# Patient Record
Sex: FEMALE | Race: WHITE | NOT HISPANIC OR LATINO | Employment: FULL TIME | URBAN - METROPOLITAN AREA
[De-identification: names, ages, dates, MRNs, and addresses within clinical notes are randomized per-mention and may not be internally consistent; named-entity substitution may affect disease eponyms.]

---

## 2020-01-16 ENCOUNTER — HISTORICAL (OUTPATIENT)
Dept: ADMINISTRATIVE | Facility: HOSPITAL | Age: 25
End: 2020-01-16

## 2020-02-27 ENCOUNTER — HISTORICAL (OUTPATIENT)
Dept: ADMINISTRATIVE | Facility: HOSPITAL | Age: 25
End: 2020-02-27

## 2022-05-03 NOTE — HISTORICAL [facility]
This is a historical note converted from [employer]. Formatting and pictures may have been removed.  Please reference [employer] for original formatting and attached multimedia. Chief Complaint  3.5 WEEK F/U IMN R FEMUR, AMBULATING WITH CRUTCHES, SOME PAIN,  History of Present Illness  Patient is 3.5 weeks s/p IMN right femur. Here today for wound check and x-rays. She is WBAT to RLE with crutches. Denies pain, doing well overall.  Review of Systems  Constitutional: negative except as stated in HPI  Eye: negative except as stated in HPI  ENMT: negative except as stated in HPI  Respiratory: negative except as stated in HPI  Cardiovascular: negative except as stated in HPI  Gastrointestinal: negative except as stated in HPI  Genitourinary: negative except as stated in HPI  Hema/Lymph: negative except as stated in HPI  Endocrine: negative except as stated in HPI  Immunologic: negative except as stated in HPI  Musculoskeletal: negative except as stated in HPI  Integumentary: negative except as stated in HPI  Neurologic: negative except as stated in HPI  ?   All Other ROS_ ?negative except as stated in HPI  Physical Exam  Vitals & Measurements  T:?37? ?C (Oral)? HR:?72(Peripheral)? RR:?20? BP:?116/68?  HT:?163?cm? WT:?48?kg? BMI:?18.07? LMP:?01/04/2020 00:00 CST?  General-Alert, oriented, no fever, chills  Anlvfkegvienxmz-XAL-gyzinsgo?c/d/i. Wound edges well approximated. No erythema, no active drainage or necrosis. Staples removed. Thigh soft and compressible. No prominent or painful hardware. She has near full extension and flexion of knee. NVID.  ?Neurologic-Alert and oriented x4, cooperative  ?Dermatologic-Skin warm, pink, dry.  Assessment/Plan  1.?Closed traumatic displaced fracture of shaft of right femur?S72.574D  ?  Patient doing well. She can continue to WBAT to RLE, ok to wean from crutches once she feels stable. Updated PT orders provided. Ok to discontinue Aspirin. Continue to work on ROM, strengthening exercises.  Patient will RTC in 6 weeks for x-rays and evaluation. Patient agrees with treatment plan and all questions and concerns were addressed.  Referrals  Clinic Follow up, *Est. 02/27/20 3:00:00 CST, Order for future visit, Closed traumatic displaced fracture of shaft of right femur, [facility]  PT/OT External Referral, 01/16/20 8:55:00 CST, Closed traumatic displaced fracture of shaft of right femur, Evaluate and Treat, 3 X Week, WBAT, NO RESTRICTIONS   Problem List/Past Medical History  Ongoing  Closed traumatic displaced fracture of shaft of right femur  Tobacco user  Historical  No qualifying data  Procedure/Surgical History  Intramedullary Joe Insertion Femur (Right) (12/22/2019)  Reposition Right Femoral Shaft with Intramedullary Internal Fixation Device, Open Approach (12/22/2019)  Resection of Spleen, Open Approach (12/21/2019)  Splenectomy (.) (12/21/2019)  Trauma Exploratory Laparotomy (.) (12/21/2019)  None   Medications  acetaminophen-oxycodone 325 mg-5 mg oral tablet, 1 tab(s), Oral, QID  albuterol 90 mcg/inh inhalation powder, 2 puff(s), INH, q4hr, PRN  busPIRone 5 mg oral tablet, 5 mg= 1 tab(s), Oral, BID  fluconazole 150 mg oral tablet, 150 mg= 1 tab(s), Oral, Once  ibuprofen 800 mg oral tablet, 800 mg= 1 tab(s), Oral, TID  oxycodone 5 mg oral tablet, 5 mg= 1 tab(s), Oral, q6hr  phenazopyridine 200 mg oral tablet, 200 mg= 1 tab(s), Oral, BID  Allergies  No Known Allergies  No Known Medication Allergies  Social History  Abuse/Neglect  No, 01/16/2020  Alcohol  Never, 12/21/2019  Home/Environment  Lives with Significant other. Living situation: Home/Independent., 12/21/2019  Substance Use  Current, opiate pills and subaxone abuse, 12/21/2019  Tobacco  4 or less cigarettes(less than 1/4 pack)/day in last 30 days, No, 01/16/2020  Family History  Hypertension: Father.  Immunizations  Vaccine Date Status Comments   meningococcal conjugate vaccine 12/25/2019 Given    pneumococcal 23-polyvalent vaccine  12/25/2019 Given    haemophilus b conjugate (PRP-T) vaccine 12/25/2019 Given    influenza virus vaccine, inactivated 12/24/2019 Given Early/Late Reason:  Med Not Available   Health Maintenance  Health Maintenance  ???Pending?(in the next year)  ??? ??Due?  ??? ? ? ?Alcohol Misuse Screening due??01/01/20??and every 1??year(s)  ??? ? ? ?Depression Screening due??01/16/20??and every?  ??? ? ? ?Tetanus Vaccine due??01/16/20??and every 10??year(s)  ??? ??Due In Future?  ??? ? ? ?ADL Screening not due until??12/23/20??and every 1??year(s)  ??? ? ? ?Obesity Screening not due until??01/01/21??and every 1??year(s)  ??? ? ? ?Smoking Cessation not due until??01/01/21??and every 1??year(s)  ??? ? ? ?Blood Pressure Screening not due until??01/15/21??and every 1??year(s)  ??? ? ? ?Body Mass Index Check not due until??01/15/21??and every 1??year(s)  ???Satisfied?(in the past 1 year)  ??? ??Satisfied?  ??? ? ? ?ADL Screening on??12/23/19.??Satisfied by Luc Smith RN  ??? ? ? ?Blood Pressure Screening on??01/16/20.??Satisfied by Tiffany Whitlock  ??? ? ? ?Body Mass Index Check on??01/16/20.??Satisfied by Tiffany Whitlock  ??? ? ? ?Influenza Vaccine on??12/24/19.??Satisfied by Julia Abreu RN  ??? ? ? ?Obesity Screening on??01/16/20.??Satisfied by Tiffany Whitlock  ??? ? ? ?Smoking Cessation on??01/16/20.??Satisfied by Tiffany Whitlock  ?  Diagnostic Results  Right femur: X-ray shows acceptable alignment, intact hardware, evidence of interval consolidation noted

## 2022-05-03 NOTE — HISTORICAL [facility]
This is a historical note converted from [employer]. Formatting and pictures may have been removed.  Please reference [employer] for original formatting and attached multimedia. Chief Complaint  9.5 WEEK F/U IMN R FEMUR, SOME SORENESS DUE TO WEATHER  History of Present Illness  Ms. Edwards is here today for follow up evaluation 9.5 weeks status post intramedullary nailing or right femoral shaft fracture. She states she is doing very well. She is ambulatory with no assistive device. She states she does have some thigh soreness with weather changes. She denies any fever or wound issues. She has not started physical therapy secondary to transportation issues and having a new born baby. She is eager to return to work as a . She does not have any other complaints today.  Review of Systems  otherwise negative  Physical Exam  General: Awake, alert, oriented. Patient in no acute distress. Well nourished and well perfused.  Musculoskeletal:?  R LE  incisions well healed with no signs of infection  no apparent swelling  good ROM at the hip and knee without pain  calf supple and non tender  dorsi/plantar flexes foot  SLT intact distally  BCR distally  Assessment/Plan  1.?Closed traumatic displaced fracture of shaft of right femur?S72.351D  Ordered:  Clinic Follow up, *Est. 04/09/20 3:00:00 CDT, Order for future visit, Closed traumatic displaced fracture of shaft of right femur, [facility]  Post-Op follow-up visit [id number] PC, Closed traumatic displaced fracture of shaft of right femur, [facility], 02/27/20 9:30:00 CST  XR Femur Right 2 Views, Routine, *Est. 04/09/20 3:00:00 CDT, Follow Up Trauma, None, Ambulatory, Rad Type, Order for future visit, Closed traumatic displaced fracture of shaft of right femur, Not Scheduled, *Est. 04/09/20 3:00:00 CDT  ?  She is doing excellently. X-rays demonstrate good progression of bone healing. She can continue activity as tolerated. I have encouraged her to begin therapy, and  she already has orders. I have provided a release to work. She will return in 6 weeks for repeat x-rays and evaluation. All questions and concerns were addressed today. She is very happy with her progress and in agreement with the treatment plan.  ?  The above findings, diagnostics, and treatment plan were discussed with  who is in agreement with the plan of care.?  Referrals  Clinic Follow up, *Est. 04/09/20 3:00:00 CDT, Order for future visit, Closed traumatic displaced fracture of shaft of right femur, [facility]   Problem List/Past Medical History  Ongoing  Closed traumatic displaced fracture of shaft of right femur  Tobacco user  Historical  No qualifying data  Procedure/Surgical History  Intramedullary Joe Insertion Femur (Right) (12/22/2019)  Reposition Right Femoral Shaft with Intramedullary Internal Fixation Device, Open Approach (12/22/2019)  Resection of Spleen, Open Approach (12/21/2019)  Splenectomy (.) (12/21/2019)  Trauma Exploratory Laparotomy (.) (12/21/2019)  None   Medications  albuterol 90 mcg/inh inhalation powder, 2 puff(s), INH, q4hr, PRN  busPIRone 5 mg oral tablet, 5 mg= 1 tab(s), Oral, BID  ethinyl estradiol-norethindrone with iron 20 mcg-1 mg oral tablet, chewable, 1 tab(s), Oral, Daily  fluconazole 150 mg oral tablet, 150 mg= 1 tab(s), Oral, Once  ibuprofen 800 mg oral tablet, 800 mg= 1 tab(s), Oral, TID  metroNIDAZOLE 0.75% vaginal gel with applicator, 1 yoon, VAG, BID  phenazopyridine 200 mg oral tablet, 200 mg= 1 tab(s), Oral, BID  Allergies  No Known Allergies  No Known Medication Allergies  Social History  Abuse/Neglect  No, 01/16/2020  Alcohol  Never, 12/21/2019  Home/Environment  Lives with Significant other. Living situation: Home/Independent., 12/21/2019  Substance Use  Current, opiate pills and subaxone abuse, 12/21/2019  Tobacco  4 or less cigarettes(less than 1/4 pack)/day in last 30 days, No, 01/16/2020  Family History  Hypertension:  Father.  Immunizations  Vaccine Date Status Comments   meningococcal conjugate vaccine 12/25/2019 Given    pneumococcal 23-polyvalent vaccine 12/25/2019 Given    haemophilus b conjugate (PRP-T) vaccine 12/25/2019 Given    influenza virus vaccine, inactivated 12/24/2019 Given Early/Late Reason:  Med Not Available   Health Maintenance  Health Maintenance  ???Pending?(in the next year)  ??? ??Due?  ??? ? ? ?Alcohol Misuse Screening due??01/01/20??and every 1??year(s)  ??? ? ? ?Depression Screening due??02/27/20??and every?  ??? ? ? ?Tetanus Vaccine due??02/27/20??and every 10??year(s)  ??? ??Due In Future?  ??? ? ? ?ADL Screening not due until??12/23/20??and every 1??year(s)  ??? ? ? ?Obesity Screening not due until??01/01/21??and every 1??year(s)  ??? ? ? ?Smoking Cessation not due until??01/01/21??and every 1??year(s)  ??? ? ? ?Blood Pressure Screening not due until??01/15/21??and every 1??year(s)  ??? ? ? ?Body Mass Index Check not due until??01/15/21??and every 1??year(s)  ???Satisfied?(in the past 1 year)  ??? ??Satisfied?  ??? ? ? ?ADL Screening on??12/23/19.??Satisfied by Luc Smith RN  ??? ? ? ?Blood Pressure Screening on??01/16/20.??Satisfied by Tiffany Whitlock  ??? ? ? ?Body Mass Index Check on??01/16/20.??Satisfied by Tiffany Whitlock  ??? ? ? ?Cervical Cancer Screening on??02/04/20.??Satisfied by Veda De La Garza  ??? ? ? ?Influenza Vaccine on??12/24/19.??Satisfied by Julia Abreu RN  ??? ? ? ?Obesity Screening on??01/16/20.??Satisfied by Tiffany Whitlock  ??? ? ? ?Smoking Cessation on??01/16/20.??Satisfied by Tiffany Whitlock  ?  Diagnostic Results  x-rays of the right?femur?demonstrate appropriate alignment with no displacement compared to previous films; hardware intact with no signs of hardware failure; interval callous noted

## 2023-12-09 ENCOUNTER — HOSPITAL ENCOUNTER (EMERGENCY)
Facility: HOSPITAL | Age: 28
Discharge: HOME | End: 2023-12-09
Attending: STUDENT IN AN ORGANIZED HEALTH CARE EDUCATION/TRAINING PROGRAM
Payer: MEDICAID

## 2023-12-09 VITALS
HEART RATE: 78 BPM | WEIGHT: 115 LBS | SYSTOLIC BLOOD PRESSURE: 109 MMHG | RESPIRATION RATE: 18 BRPM | DIASTOLIC BLOOD PRESSURE: 73 MMHG | TEMPERATURE: 98 F | HEIGHT: 64 IN | BODY MASS INDEX: 19.63 KG/M2 | OXYGEN SATURATION: 100 %

## 2023-12-09 DIAGNOSIS — T40.1X1A HEROIN OVERDOSE, ACCIDENTAL OR UNINTENTIONAL, INITIAL ENCOUNTER: Primary | ICD-10-CM

## 2023-12-09 LAB
ALBUMIN SERPL-MCNC: 4.5 G/DL (ref 3.5–5)
ALBUMIN/GLOB SERPL: 1.3 RATIO (ref 1.1–2)
ALP SERPL-CCNC: 61 UNIT/L (ref 40–150)
ALT SERPL-CCNC: 16 UNIT/L (ref 0–55)
APPEARANCE UR: ABNORMAL
AST SERPL-CCNC: 23 UNIT/L (ref 5–34)
B-HCG SERPL QL: NEGATIVE
BACTERIA #/AREA URNS AUTO: ABNORMAL /HPF
BASOPHILS # BLD AUTO: 0.05 X10(3)/MCL
BASOPHILS NFR BLD AUTO: 0.4 %
BILIRUB SERPL-MCNC: 0.6 MG/DL
BILIRUB UR QL STRIP.AUTO: NEGATIVE
BUN SERPL-MCNC: 11 MG/DL (ref 7–18.7)
CALCIUM SERPL-MCNC: 9.1 MG/DL (ref 8.4–10.2)
CHLORIDE SERPL-SCNC: 105 MMOL/L (ref 98–107)
CO2 SERPL-SCNC: 21 MMOL/L (ref 22–29)
COLOR UR AUTO: YELLOW
CREAT SERPL-MCNC: 0.79 MG/DL (ref 0.55–1.02)
EOSINOPHIL # BLD AUTO: 0.04 X10(3)/MCL (ref 0–0.9)
EOSINOPHIL NFR BLD AUTO: 0.3 %
ERYTHROCYTE [DISTWIDTH] IN BLOOD BY AUTOMATED COUNT: 14.6 % (ref 11.5–17)
GFR SERPLBLD CREATININE-BSD FMLA CKD-EPI: >60 MLS/MIN/1.73/M2
GLOBULIN SER-MCNC: 3.4 GM/DL (ref 2.4–3.5)
GLUCOSE SERPL-MCNC: 197 MG/DL (ref 74–100)
GLUCOSE UR QL STRIP.AUTO: ABNORMAL
HCT VFR BLD AUTO: 34.1 % (ref 37–47)
HGB BLD-MCNC: 11.2 G/DL (ref 12–16)
IMM GRANULOCYTES # BLD AUTO: 0.08 X10(3)/MCL (ref 0–0.04)
IMM GRANULOCYTES NFR BLD AUTO: 0.6 %
KETONES UR QL STRIP.AUTO: ABNORMAL
LEUKOCYTE ESTERASE UR QL STRIP.AUTO: 25
LYMPHOCYTES # BLD AUTO: 2.91 X10(3)/MCL (ref 0.6–4.6)
LYMPHOCYTES NFR BLD AUTO: 20.7 %
MCH RBC QN AUTO: 30.7 PG (ref 27–31)
MCHC RBC AUTO-ENTMCNC: 32.8 G/DL (ref 33–36)
MCV RBC AUTO: 93.4 FL (ref 80–94)
MONOCYTES # BLD AUTO: 0.65 X10(3)/MCL (ref 0.1–1.3)
MONOCYTES NFR BLD AUTO: 4.6 %
MUCOUS THREADS URNS QL MICRO: ABNORMAL /LPF
NEUTROPHILS # BLD AUTO: 10.34 X10(3)/MCL (ref 2.1–9.2)
NEUTROPHILS NFR BLD AUTO: 73.4 %
NITRITE UR QL STRIP.AUTO: NEGATIVE
NRBC BLD AUTO-RTO: 0 %
PH UR STRIP.AUTO: 5.5 [PH]
PLATELET # BLD AUTO: 250 X10(3)/MCL (ref 130–400)
PMV BLD AUTO: 12 FL (ref 7.4–10.4)
POTASSIUM SERPL-SCNC: 3.5 MMOL/L (ref 3.5–5.1)
PROT SERPL-MCNC: 7.9 GM/DL (ref 6.4–8.3)
PROT UR QL STRIP.AUTO: ABNORMAL
RBC # BLD AUTO: 3.65 X10(6)/MCL (ref 4.2–5.4)
RBC #/AREA URNS AUTO: ABNORMAL /HPF
RBC UR QL AUTO: NEGATIVE
SODIUM SERPL-SCNC: 137 MMOL/L (ref 136–145)
SP GR UR STRIP.AUTO: 1.03 (ref 1–1.03)
SQUAMOUS #/AREA URNS LPF: ABNORMAL /HPF
UROBILINOGEN UR STRIP-ACNC: NORMAL
WBC # SPEC AUTO: 14.07 X10(3)/MCL (ref 4.5–11.5)
WBC #/AREA URNS AUTO: ABNORMAL /HPF

## 2023-12-09 PROCEDURE — 87186 SC STD MICRODIL/AGAR DIL: CPT | Performed by: STUDENT IN AN ORGANIZED HEALTH CARE EDUCATION/TRAINING PROGRAM

## 2023-12-09 PROCEDURE — 80053 COMPREHEN METABOLIC PANEL: CPT | Performed by: STUDENT IN AN ORGANIZED HEALTH CARE EDUCATION/TRAINING PROGRAM

## 2023-12-09 PROCEDURE — 81025 URINE PREGNANCY TEST: CPT | Performed by: STUDENT IN AN ORGANIZED HEALTH CARE EDUCATION/TRAINING PROGRAM

## 2023-12-09 PROCEDURE — 87086 URINE CULTURE/COLONY COUNT: CPT | Performed by: STUDENT IN AN ORGANIZED HEALTH CARE EDUCATION/TRAINING PROGRAM

## 2023-12-09 PROCEDURE — 85025 COMPLETE CBC W/AUTO DIFF WBC: CPT | Performed by: STUDENT IN AN ORGANIZED HEALTH CARE EDUCATION/TRAINING PROGRAM

## 2023-12-09 PROCEDURE — 96361 HYDRATE IV INFUSION ADD-ON: CPT

## 2023-12-09 PROCEDURE — 96374 THER/PROPH/DIAG INJ IV PUSH: CPT

## 2023-12-09 PROCEDURE — 63600175 PHARM REV CODE 636 W HCPCS: Performed by: STUDENT IN AN ORGANIZED HEALTH CARE EDUCATION/TRAINING PROGRAM

## 2023-12-09 PROCEDURE — 81001 URINALYSIS AUTO W/SCOPE: CPT | Performed by: STUDENT IN AN ORGANIZED HEALTH CARE EDUCATION/TRAINING PROGRAM

## 2023-12-09 PROCEDURE — 99284 EMERGENCY DEPT VISIT MOD MDM: CPT | Mod: 25

## 2023-12-09 RX ORDER — NALOXONE HYDROCHLORIDE 4 MG/.1ML
SPRAY NASAL
Qty: 1 EACH | Refills: 11 | Status: SHIPPED | OUTPATIENT
Start: 2023-12-09

## 2023-12-09 RX ORDER — BUSPIRONE HYDROCHLORIDE 10 MG/1
10 TABLET ORAL 3 TIMES DAILY
COMMUNITY

## 2023-12-09 RX ORDER — BUPRENORPHINE HYDROCHLORIDE, NALOXONE HYDROCHLORIDE 8; 2 MG/1; MG/1
FILM, SOLUBLE BUCCAL; SUBLINGUAL
COMMUNITY
Start: 2023-09-19

## 2023-12-09 RX ORDER — LEUCOVORIN, FOLIC ACID, LEVOMEFOLATE MAGNESIUM, FERROUS CYSTEINE GLYCINATE, 1,2-DOCOSAHEXANOYL-SN-GLYCERO-3-PHOSPHOSERINE CALCIUM, 1,2-ICOSAPENTOYL-SN-GLYCERO-3-PHOSPHOSERINE CALCIUM, PHOSPHATIDYL SERINE, PYRIDOXAL 5-PHOSPHATE, FLAVIN ADENINE DINUCLEOTIDE, NADH, COBAMAMIDE, COCARBOXYLASE (THIAMINE PYROPHOSPHATE), MAGNESIUM ASCORBATE, ZINC ASCORBATE, MAGNESIUM L-THREONATE AND BETAINE 2.5; 1; 7; 13.6; 6.4; 800; 12; 25; 25; 25; 50; 25; 24; 1; 1; 500; 1.83; 3.67 MG/1; MG/1; MG/1; MG/1; MG/1; UG/1; MG/1; UG/1; UG/1; UG/1; UG/1; UG/1; MG/1; MG/1; MG/1; UG/1; MG/1; MG/1
1 CAPSULE, DELAYED RELEASE PELLETS ORAL
COMMUNITY
Start: 2023-09-04

## 2023-12-09 RX ORDER — ARIPIPRAZOLE 5 MG/1
5 TABLET ORAL
COMMUNITY
Start: 2023-12-04

## 2023-12-09 RX ORDER — ESCITALOPRAM OXALATE 10 MG/1
10 TABLET ORAL
COMMUNITY
Start: 2023-10-12

## 2023-12-09 RX ORDER — ONDANSETRON 2 MG/ML
INJECTION INTRAMUSCULAR; INTRAVENOUS
Status: COMPLETED
Start: 2023-12-09 | End: 2023-12-09

## 2023-12-09 RX ORDER — ONDANSETRON 2 MG/ML
4 INJECTION INTRAMUSCULAR; INTRAVENOUS
Status: COMPLETED | OUTPATIENT
Start: 2023-12-09 | End: 2023-12-09

## 2023-12-09 RX ORDER — TRAZODONE HYDROCHLORIDE 100 MG/1
100 TABLET ORAL NIGHTLY
COMMUNITY

## 2023-12-09 RX ADMIN — SODIUM CHLORIDE, POTASSIUM CHLORIDE, SODIUM LACTATE AND CALCIUM CHLORIDE 1000 ML: 600; 310; 30; 20 INJECTION, SOLUTION INTRAVENOUS at 05:12

## 2023-12-09 RX ADMIN — ONDANSETRON 4 MG: 2 INJECTION INTRAMUSCULAR; INTRAVENOUS at 05:12

## 2023-12-09 NOTE — ED NOTES
Bed: 22  Expected date: 12/9/23  Expected time:   Means of arrival:   Comments:  28 F OD on heroin, narcan, GCS 15

## 2023-12-09 NOTE — ED PROVIDER NOTES
Encounter Date: 12/9/2023    SCRIBE #1 NOTE: I, Esperanza Mike, am scribing for, and in the presence of,  Néstor Chirinos MD. I have scribed the following portions of the note - Other sections scribed: HPI, ROS, PE.       History     Chief Complaint   Patient presents with    Drug Overdose     Found unresponsive by boyfriend. OD on heroin. Initially GCS 3. Received 3 mg Narcan IN via fire dept. 2 mg Narcan IV per EMS. Also received 4 mg Zofran in route with ems. GCS 15 on arrival.     Patient is a 28 year old female with a history of depression and substance abuse that presents to the ED following a drug overdose. Per EMS, patient was found unresponsive by her significant other; she was administered a total of 5mg of Narcan. Patient reports she smoked heroin today after being sober for 3 months; she denies using any additional substances or alcohol. Patient is complaining of nausea and vomiting. She also reports starting a new medication, Abilify, a few days ago. Patient denies suicidal ideation.     The history is provided by the patient, the EMS personnel and medical records. No  was used.     Review of patient's allergies indicates:  No Known Allergies  Past Medical History:   Diagnosis Date    Insomnia      No past surgical history on file.  No family history on file.     Review of Systems   Gastrointestinal:  Positive for nausea and vomiting.   Psychiatric/Behavioral:  Negative for suicidal ideas.         Substance use and overdose       Physical Exam     Initial Vitals [12/09/23 1656]   BP Pulse Resp Temp SpO2   124/80 94 16 98.1 °F (36.7 °C) 100 %      MAP       --         Physical Exam    Nursing note and vitals reviewed.  Constitutional: She appears well-developed and well-nourished. She is not diaphoretic. No distress.   Emesis bag in hand   HENT:   Head: Normocephalic and atraumatic.   Right Ear: External ear normal.   Left Ear: External ear normal.   Nose: Nose normal.   Eyes:  Conjunctivae and EOM are normal. Pupils are equal, round, and reactive to light. Right eye exhibits no discharge. Left eye exhibits no discharge.   Cardiovascular:  Regular rhythm, normal heart sounds and intact distal pulses.   Tachycardia present.   Exam reveals no gallop and no friction rub.       No murmur heard.  Pulmonary/Chest: Breath sounds normal. No respiratory distress. She has no wheezes. She has no rhonchi. She has no rales. She exhibits no tenderness.   Abdominal: Abdomen is soft. Bowel sounds are normal. She exhibits no distension and no mass. There is no abdominal tenderness. There is no rebound and no guarding.   Musculoskeletal:         General: No edema. Normal range of motion.     Neurological: She is alert and oriented to person, place, and time. No cranial nerve deficit or sensory deficit. GCS score is 15. GCS eye subscore is 4. GCS verbal subscore is 5. GCS motor subscore is 6.   Awake.    Skin: Skin is warm and dry. Capillary refill takes less than 2 seconds. No erythema. No pallor.         ED Course   Procedures  Labs Reviewed   CULTURE, URINE - Abnormal; Notable for the following components:       Result Value    Urine Culture >/= 100,000 colonies/ml Gram-negative Rods (*)     All other components within normal limits   COMPREHENSIVE METABOLIC PANEL - Abnormal; Notable for the following components:    Carbon Dioxide 21 (*)     Glucose Level 197 (*)     All other components within normal limits   URINALYSIS, REFLEX TO URINE CULTURE - Abnormal; Notable for the following components:    Appearance, UA Turbid (*)     Protein, UA 1+ (*)     Glucose, UA Trace (*)     Ketones, UA Trace (*)     Leukocyte Esterase, UA 25 (*)     WBC, UA 11-20 (*)     Squamous Epithelial Cells, UA Few (*)     Mucous, UA Occasional (*)     All other components within normal limits   CBC WITH DIFFERENTIAL - Abnormal; Notable for the following components:    WBC 14.07 (*)     RBC 3.65 (*)     Hgb 11.2 (*)     Hct 34.1  (*)     MCHC 32.8 (*)     MPV 12.0 (*)     Neut # 10.34 (*)     IG# 0.08 (*)     All other components within normal limits   PREGNANCY TEST, URINE RAPID - Normal   CBC W/ AUTO DIFFERENTIAL    Narrative:     The following orders were created for panel order CBC auto differential.  Procedure                               Abnormality         Status                     ---------                               -----------         ------                     CBC with Differential[[id number]]        Abnormal            Final result                 Please view results for these tests on the individual orders.          Imaging Results              X-Ray Chest AP Portable (Final result)  Result time 12/09/23 18:47:12      Final result by Izzy Peña MD (12/09/23 18:47:12)                   Impression:      No acute cardiopulmonary abnormality.      Electronically signed by: Izzy Peña  Date:    12/09/2023  Time:    18:47               Narrative:    EXAMINATION:  XR CHEST AP PORTABLE    CLINICAL HISTORY:  N/V;    TECHNIQUE:  Single frontal view of the chest was performed.    COMPARISON:  None    FINDINGS:  LINES AND TUBES: EKG/telemetry leads overlie the chest.    MEDIASTINUM AND TANISHA: The cardiac silhouette is normal.    LUNGS: No lobar consolidation. No edema.  There are punctate granulomata within the lungs.    PLEURA:No pleural effusion. No pneumothorax.    BONES: No acute osseous abnormality.                                       Medications   ondansetron injection 4 mg (4 mg Intravenous Given 12/9/23 1710)   ondansetron 4 mg/2 mL injection (  Override Pull 12/9/23 1710)   lactated ringers bolus 1,000 mL (0 mLs Intravenous Stopped 12/9/23 1845)     Medical Decision Making  Differential diagnosis to include but not limited to accidental drug overdose, suicide ideation, suicide attempt, altered mental status.      Patient is awake alert well-appearing.  She does not require any further Narcan here in the  emergency department.  Tachycardia has resolved.  No further bouts of nausea and vomiting.  She was monitored for 3 hours.  Took a nap.  No adverse events.  Respirations at 18.  Oxygen saturation 100%.  Will have [place] come see and evaluate patient.  Will discharge with Narcan.  She is adamant this was not a suicide attempt.  Believe this time she is suitable for discharge home.  Will be discharged with Narcan.  Return precautions given.  Questions invited, questions answered to the best my ability.  Patient discharged home condition stable.      Amount and/or Complexity of Data Reviewed  Independent Historian: EMS     Details: Per EMS, patient was found unresponsive by her significant other; she was administered a total of 5mg of Narcan.  External Data Reviewed: notes.     Details: Documented in ED course  Labs: ordered. Decision-making details documented in ED Course.  Radiology: ordered and independent interpretation performed.    Risk  Prescription drug management.            Scribe Attestation:   Scribe #1: I performed the above scribed service and the documentation accurately describes the services I performed. I attest to the accuracy of the note.    Attending Attestation:           Physician Attestation for Scribe:  Physician Attestation Statement for Scribe #1: I, Néstor Chirinos MD, reviewed documentation, as scribed by Esperanza Mckeon in my presence, and it is both accurate and complete.             ED Course as of 12/10/23 1548   Sat Dec 09, 2023   1707 Patient is seen in the past for facial cellulitis, vaginal bleeding, dermatitis. [MM]   1804 CBC auto differential(!)  Mild leukocytosis.  Mild anemia. [MM]   1928 Urinalysis, Reflex to Urine Culture(!)  Possible urinary tract infection [MM]   1928 Preg Test, Ur: Negative [MM]   1928 Comprehensive metabolic panel(!)  Mild hyperglycemia no electrolyte abnormality no renal dysfunction [MM]   1952 On re-evaluation patient resting comfortably.  NAD.  No  respiratory distress.  Denies any complaints.  Did have a nap.  Vitals are stable.  Oxygen saturation 100% on room air. [MM]   Sun Dec 10, 2023   1542 Ultimately patient refused [place].  Suitable for discharge home.  Denied any urinary symptoms. [MM]      ED Course User Index  [MM] Néstor Chirinos MD                           Clinical Impression:  Final diagnoses:  [T40.1X1A] Heroin overdose, accidental or unintentional, initial encounter (Primary)          ED Disposition Condition    Discharge           ED Prescriptions       Medication Sig Dispense Start Date End Date Auth. Provider    naloxone (NARCAN) 4 mg/actuation Spry 4mg by nasal route as needed for opioid overdose; may repeat every 2-3 minutes in alternating nostrils until medical help arrives. Call 911 1 each 12/9/2023 -- Néstor Chirinos MD          Follow-up Information       Follow up With Specialties Details Why Contact [facility]  Call   [address] [facility] [id number] [phone]               Néstor Chirinos MD  12/10/23 9083

## 2023-12-10 NOTE — DISCHARGE INSTRUCTIONS
Thanks for letting us take care of you today!  It is our goal to give you courteous care and to keep you comfortable and informed, if you have any questions before you leave I will be happy to try and answer them.    Here is some advice after your visit:    Your visit in the emergency department is NOT definitive care - please follow-up with your primary care doctor and/or specialist within 1-2 days. Please return to the emergency department if you develop worsening symptoms including: fever, chills, chest pain, shortness of breath, weakness, numbness, tingling, nausea, vomiting, inability to eat, drink, or take your medication. Please return if you have any worsening in your condition or if you have any other concerns.    If you had radiology exams like an XRAY or CT in the emergency Department the interpreation on them may be preliminary - there may be less time sensitive findings on the reports please obtain these reports within 24 hours from the hospital or by using your out on your mobile phone to access records.  Bring these to your primary care doctor and/or specialist for further review of incidental findings.    Please review any LAB WORK from your visit today with your primary care physician.    Please abstain from any illicit drug use in the future.  Please always have your Narcan on you.  If you ever use the Narcan, or have it used on you, please come in the emergency department for further evaluation and monitoring.

## 2023-12-11 LAB — BACTERIA UR CULT: ABNORMAL
